# Patient Record
Sex: FEMALE | Race: BLACK OR AFRICAN AMERICAN | NOT HISPANIC OR LATINO
[De-identification: names, ages, dates, MRNs, and addresses within clinical notes are randomized per-mention and may not be internally consistent; named-entity substitution may affect disease eponyms.]

---
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
## 2021-01-06 NOTE — DISCHARGE NOTE NURSING/CASE MANAGEMENT/SOCIAL WORK - NSDCPECAREGIVERED_GEN_ALL_CORE
Call to patient, identified with 2 identifiers. Advised of Dr. Harish Herbert' note and recommendations. Told her I would call her back with who Dr. Harish Herbert wants her to see. Yes

## 2021-02-02 ENCOUNTER — ASOB RESULT (OUTPATIENT)
Age: 21
End: 2021-02-02

## 2021-02-02 ENCOUNTER — APPOINTMENT (OUTPATIENT)
Dept: ANTEPARTUM | Facility: CLINIC | Age: 21
End: 2021-02-02
Payer: MEDICAID

## 2021-02-02 ENCOUNTER — OUTPATIENT (OUTPATIENT)
Dept: OUTPATIENT SERVICES | Facility: HOSPITAL | Age: 21
LOS: 1 days | Discharge: HOME | End: 2021-02-02

## 2021-02-02 ENCOUNTER — TRANSCRIPTION ENCOUNTER (OUTPATIENT)
Age: 21
End: 2021-02-02

## 2021-02-02 DIAGNOSIS — Z36.3 ENCOUNTER FOR ANTENATAL SCREENING FOR MALFORMATIONS: ICD-10-CM

## 2021-02-02 DIAGNOSIS — Z3A.22 22 WEEKS GESTATION OF PREGNANCY: ICD-10-CM

## 2022-11-23 NOTE — DISCHARGE NOTE NURSING/CASE MANAGEMENT/SOCIAL WORK - NSDCPECAREEDUMAT _GEN_ALL_CORE
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 Plan: Band K with chronic punctate keratitis. Start PA 1% tid OS and add BCL. May need cornea consult. RTC x 1  wk. Also consider doxy. LSCD.  Placed BSCL  8.4 B&L in OS Enoxaparin/Lovenox

## 2023-11-16 NOTE — PATIENT PROFILE PEDIATRIC. - FUNCTIONAL SCREEN CURRENT LEVEL: DRESSING, MLM
Your current Orthopedic diagnosis we are working together to treat is:    right Greater Trochanteric Pain Syndrome   left Knee arthritis     Voltaren Gel - 4 times daily as needed    Physical Therapy      Physical therapy will help your recovery. Please make your appointments by calling for visits at [facility] - [address], [phone] . It would be advisable to follow up with me upon completion of your therapy, unless otherwise noted.     It is recommended you schedule a follow-up appointment with Chato Ocasio MD.  as needed.   Office hours are 8:00 am to 5:00 pm Monday through Friday. If it is urgent that you speak with someone outside of these hours, our [facility] Call Center will be able to assist you. You can reach the office by calling [phone]   We are constantly looking at ways to improve the care we provide; you might receive a survey by mail or e-mail and we (I) would appreciate if you would take a few minutes and complete. Your feedback is important and helps us know how we are doing. We really appreciate it.   [facility]!      0 = independent

## 2024-07-20 NOTE — ED CLERICAL - NS ED CLERK NOTE PRE-ARRIVAL INFORMATION; ADDITIONAL PRE-ARRIVAL INFORMATION
This patient is enrolled in the readmission program and has active care navigation. This patient can be followed up by the care navigation team within 24 hours. To arrange close follow-up or to obtain additional clinical information about this patient, please call the contact number above. [id number]
Yes

## 2024-11-29 ENCOUNTER — EMERGENCY (EMERGENCY)
Facility: HOSPITAL | Age: 24
LOS: 0 days | Discharge: HOME | End: 2024-11-29
Attending: EMERGENCY MEDICINE
Payer: COMMERCIAL

## 2024-11-29 VITALS
OXYGEN SATURATION: 99 % | DIASTOLIC BLOOD PRESSURE: 75 MMHG | HEART RATE: 86 BPM | SYSTOLIC BLOOD PRESSURE: 112 MMHG | RESPIRATION RATE: 18 BRPM

## 2024-11-29 VITALS
DIASTOLIC BLOOD PRESSURE: 78 MMHG | HEART RATE: 87 BPM | OXYGEN SATURATION: 99 % | TEMPERATURE: 98 F | RESPIRATION RATE: 18 BRPM | WEIGHT: 110.89 LBS | SYSTOLIC BLOOD PRESSURE: 114 MMHG
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
## 2024-11-29 DIAGNOSIS — S96.912A STRAIN OF UNSPECIFIED MUSCLE AND TENDON AT ANKLE AND FOOT LEVEL, LEFT FOOT, INITIAL ENCOUNTER: ICD-10-CM

## 2024-11-29 DIAGNOSIS — M25.572 PAIN IN LEFT ANKLE AND JOINTS OF LEFT FOOT: ICD-10-CM

## 2024-11-29 PROCEDURE — 99284 EMERGENCY DEPT VISIT MOD MDM: CPT

## 2024-11-29 PROCEDURE — 73610 X-RAY EXAM OF ANKLE: CPT | Mod: LT

## 2024-11-29 PROCEDURE — 73610 X-RAY EXAM OF ANKLE: CPT | Mod: 26,LT

## 2024-11-29 PROCEDURE — 99283 EMERGENCY DEPT VISIT LOW MDM: CPT | Mod: 25

## 2024-11-29 NOTE — ED PROVIDER NOTE - PHYSICAL EXAMINATION
generalized: alert, no distress  eyes: clear conjunctiva  msk: left ankle- no 5th metatarsal tenderness, +achilles in tact, no bony deformity, good rom of extremity, good cap refill, pulses distally in tact, no crepitation , no prox tibial tenderness  skin: no bruising, no swelling, no lacerations   neuro: alert, oriented, no motor or sensory deficits, gait steady

## 2024-11-29 NOTE — ED PROVIDER NOTE - ATTENDING APP SHARED VISIT CONTRIBUTION OF CARE
Mild tenderness over the deltoid ligament without swelling.  Full range of motion without pain.  Distal neurovascular function is intact.  X-ray reviewed no fracture seen.

## 2024-11-29 NOTE — ED PROVIDER NOTE - PATIENT PORTAL LINK FT
You can access the FollowMyHealth Patient Portal offered by [facility] by registering at the following website: [email or web address]. By joining [facility]’s FollowMyHealth portal, you will also be able to view your health information using other applications (apps) compatible with our system.

## 2024-11-29 NOTE — ED PROVIDER NOTE - OBJECTIVE STATEMENT
25 y/o female presents to the ED with left ankle pain s/p mechanical fall yesterday. no distal tingling. no knee or foot pain. no swelling or bruising. patient c/o pain worse with movement.

## 2025-03-06 NOTE — ED ADULT TRIAGE NOTE - ISOLATION TYPE:
Bedrest  Diet as tolerated  Increase fluid intake   May alternate Tylenol regular strength 2 tablets every 6-8 hours, with ketorolac  Warm salt water gargles 2-3 times a day  Limit close facial contact with family friends  If you develop new chest tightness wheezing shortness of breath nausea vomiting diarrhea more than 5 times daily report to the emergency room,   Contact precautions.../Airborne precautions.../Droplet precautions...